# Patient Record
Sex: FEMALE | Race: BLACK OR AFRICAN AMERICAN | Employment: UNEMPLOYED | URBAN - METROPOLITAN AREA
[De-identification: names, ages, dates, MRNs, and addresses within clinical notes are randomized per-mention and may not be internally consistent; named-entity substitution may affect disease eponyms.]

---

## 2017-04-21 ENCOUNTER — HOSPITAL ENCOUNTER (EMERGENCY)
Facility: CLINIC | Age: 52
Discharge: HOME | End: 2017-04-21
Attending: FAMILY MEDICINE | Admitting: FAMILY MEDICINE

## 2017-04-21 ENCOUNTER — APPOINTMENT (OUTPATIENT)
Dept: GENERAL RADIOLOGY | Facility: CLINIC | Age: 52
End: 2017-04-21

## 2017-04-21 VITALS
SYSTOLIC BLOOD PRESSURE: 110 MMHG | TEMPERATURE: 98.6 F | DIASTOLIC BLOOD PRESSURE: 89 MMHG | WEIGHT: 153.2 LBS | HEART RATE: 75 BPM | RESPIRATION RATE: 20 BRPM | BODY MASS INDEX: 27.14 KG/M2 | OXYGEN SATURATION: 100 %
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
## 2017-04-21 DIAGNOSIS — S89.91XA INJURY OF RIGHT KNEE: ICD-10-CM

## 2017-04-21 DIAGNOSIS — S89.91XS RIGHT KNEE INJURY, SEQUELA: ICD-10-CM

## 2017-04-21 DIAGNOSIS — T73.3XXA OVEREXERTION FROM PROLONGED STATIC POSITION: ICD-10-CM

## 2017-04-21 PROCEDURE — 73562 X-RAY EXAM OF KNEE 3: CPT | Mod: RT

## 2017-04-21 PROCEDURE — 99283 EMERGENCY DEPT VISIT LOW MDM: CPT

## 2017-04-21 PROCEDURE — 99283 EMERGENCY DEPT VISIT LOW MDM: CPT | Mod: GC | Performed by: FAMILY MEDICINE

## 2017-04-21 ASSESSMENT — ENCOUNTER SYMPTOMS
FEVER: 0
RHINORRHEA: 0
EYE REDNESS: 0
ABDOMINAL PAIN: 0
JOINT SWELLING: 1
COUGH: 0
VOMITING: 0
DYSURIA: 0
CONSTIPATION: 0
COLOR CHANGE: 0
ARTHRALGIAS: 0
DIFFICULTY URINATING: 0
DIARRHEA: 0
MYALGIAS: 0
BACK PAIN: 0
WHEEZING: 0
CONFUSION: 0
WEAKNESS: 0
SHORTNESS OF BREATH: 0
CHILLS: 0
HEADACHES: 0
LIGHT-HEADEDNESS: 0
NAUSEA: 0
DIAPHORESIS: 0
WOUND: 0
SORE THROAT: 0
SINUS PRESSURE: 0
NUMBNESS: 0
DIZZINESS: 0
EYE PAIN: 0
NECK STIFFNESS: 0
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
## 2017-04-21 NOTE — LETTER
[facility], [place], EMERGENCY DEPARTMENT  [address]  [place] [address]  [phone]    2017    Juan F Bahena  [address] [place] [address]  [place] [address]  [phone] (home)     : 1965      To Whom it may concern:    Juan F Bahena was seen in our Emergency Department today, 2017.  I expect her condition to improve over the next 3 days.  She may return to work in the next few days when improved.    Sincerely,        Eb Casey MD

## 2017-04-21 NOTE — DISCHARGE INSTRUCTIONS
You came to the Emergency Department with right knee pain and suspicion of exacerbation of previous knee injury.   Knee XR did not show injury to bone, cartilage or tendons of knee.  It is believed that you have re-exacerbated your prior tibial plateau fracture.  We recommend you pursue RICE:    R: Rest (minimize weight-bearing.)    I: Ice (10 minutes per hour)    C: Compression (ACE bandage)    E: Elevation (when possible)  We recommend you follow up with your primary care provider, or with an outpatient sports medicine clinic, in the next 1-2 weeks.  Please return to clinic or ED if you experience significant worsening of your symptoms.    A work excuse note has been issued for you.    Please make an appointment to follow up with one of the following:    Your Primary Care Provider,     Primary Care Center (phone: [phone],     [facility] (phone: [phone]),     [facility] (phone: [phone]),     [facility] (phone: [phone]),     Sports Medicine (phone: [phone]) and     [facility] [phone]   in the next 7-14 days.      Common Kneecap (Patella) Problems  If the kneecap is  off track  even slightly (a tracking problem), it can cause uneven pressure on the back of the kneecap. This can cause pain and difficulty with movements, such as walking and going down stairs. Below are some common causes of kneecap pain.    Cartilage damage  Sometimes the cartilage on the back of the kneecap or in the groove of the thighbone is damaged. Damaged cartilage can t spread pressure evenly. Uneven pressure wears down the cartilage even further.   Dislocation  Sometimes a muscle or ligament in the knee is pulled the wrong way. Or the kneecap may be pushed too hard. Then the kneecap may move partly out of the groove (subluxation). It may even move completely out (dislocation).     Patellar tendinitis  Patellar tendinitis ( jumper s knee ) happens when  the quadriceps muscles are overused or tight. During movement, the patellar tendon absorbs more shock than usual. The tendon becomes irritated or damaged.   Plica syndrome  Plica bands are tissue fibers that some people have near the kneecap. They usually cause no problems. But sometimes they can become irritated or inflamed.     5653-8222 [employer]. [address] [place] [address], [place] [address]. All rights reserved. This information is not intended as a substitute for professional medical care. Always follow your healthcare professional's instructions.

## 2017-04-21 NOTE — ED AVS SNAPSHOT
[facility], Emergency Department    [address]    [place] [address]    Phone:  [phone]    Fax:  [phone]                                       Juan F Bahena   MRN: [id number]    Department:  [facility], Emergency Department   Date of Visit:  4/21/2017           Patient Information     Date Of Birth          1965        Your diagnoses for this visit were:     Right knee injury, sequela        You were seen by Gary Hill MD.      Follow-up Information     Follow up with Kinsey Jean Baptiste MD In 1 week.    Specialty:  OB/Gyn    Contact information:    [facility]  [address]  [facility] [address] [phone]          Discharge Instructions       You came to the Emergency Department with right knee pain and suspicion of exacerbation of previous knee injury.   Knee XR did not show injury to bone, cartilage or tendons of knee.  It is believed that you have re-exacerbated your prior tibial plateau fracture.  We recommend you pursue RICE:    R: Rest (minimize weight-bearing.)    I: Ice (10 minutes per hour)    C: Compression (ACE bandage)    E: Elevation (when possible)  We recommend you follow up with your primary care provider, or with an outpatient sports medicine clinic, in the next 1-2 weeks.  Please return to clinic or ED if you experience significant worsening of your symptoms.    A work excuse note has been issued for you.    Please make an appointment to follow up with one of the following:    Your Primary Care Provider,     Primary Care Center (phone: [phone],     [facility] (phone: [phone]),     [facility] (phone: [phone]),     [facility] (phone: [phone]),     Sports Medicine (phone: [phone]) and     [facility] [phone]   in the next 7-14 days.      Common Kneecap (Patella) Problems  If the kneecap is  off track  even slightly (a tracking problem), it can  cause uneven pressure on the back of the kneecap. This can cause pain and difficulty with movements, such as walking and going down stairs. Below are some common causes of kneecap pain.    Cartilage damage  Sometimes the cartilage on the back of the kneecap or in the groove of the thighbone is damaged. Damaged cartilage can t spread pressure evenly. Uneven pressure wears down the cartilage even further.   Dislocation  Sometimes a muscle or ligament in the knee is pulled the wrong way. Or the kneecap may be pushed too hard. Then the kneecap may move partly out of the groove (subluxation). It may even move completely out (dislocation).     Patellar tendinitis  Patellar tendinitis ( jumper s knee ) happens when the quadriceps muscles are overused or tight. During movement, the patellar tendon absorbs more shock than usual. The tendon becomes irritated or damaged.   Plica syndrome  Plica bands are tissue fibers that some people have near the kneecap. They usually cause no problems. But sometimes they can become irritated or inflamed.     9665-3133 [employer]. [address] [place] [address]. All rights reserved. This information is not intended as a substitute for professional medical care. Always follow your healthcare professional's instructions.              24 Hour Appointment Hotline       To make an appointment at any [facility], call [phone] ([phone]). If you don't have a family doctor or clinic, we will help you find one. [facility] clinics are conveniently located to serve the needs of you and your family.             Review of your medicines      Our records show that you are taking the medicines listed below. If these are incorrect, please call your family doctor or clinic.        Dose / Directions Last dose taken    acetaminophen 325 MG tablet   Commonly known as:  TYLENOL   Dose:  650 mg   Quantity:  100 tablet        Take 2 tablets (650 mg) by mouth every 4 hours  "as needed for mild pain   Refills:  0        ibuprofen 800 MG tablet   Commonly known as:  ADVIL/MOTRIN   Dose:  800 mg   Quantity:  90 tablet        Take 1 tablet (800 mg) by mouth every 8 hours as needed for moderate pain   Refills:  1                Procedures and tests performed during your visit     Knee XR, 3 views, right      Orders Needing Specimen Collection     None      Pending Results     Date and Time Order Name Status Description    2017 1710 Knee XR, 3 views, right Preliminary             Pending Culture Results     No orders found from 2017 to 2017.            Thank you for choosing [facility]       Thank you for choosing [facility] for your care. Our goal is always to provide you with excellent care. Hearing back from our patients is one way we can continue to improve our services. Please take a few minutes to complete the written survey that you may receive in the mail after you visit with us. Thank you!        [employer] Information     [employer] lets you send messages to your doctor, view your test results, renew your prescriptions, schedule appointments and more. To sign up, go to [email or web address] . Click on \"Log in\" on the left side of the screen, which will take you to the Welcome page. Then click on \"Sign up Now\" on the right side of the page.     You will be asked to enter the access code listed below, as well as some personal information. Please follow the directions to create your username and password.     Your access code is: [email or web address]  Expires: 2017  6:37 PM     Your access code will  in 90 days. If you need help or a new code, please call your [facility] clinic or [phone].        Care EveryWhere ID     This is your Care EveryWhere ID. This could be used by other organizations to access your [facility] medical records  [id number]        After Visit Summary       This is your record. Keep this with you and show to your community pharmacist(s) and doctor(s) " at your next visit.

## 2017-04-21 NOTE — ED AVS SNAPSHOT
[facility], [place], Emergency Department    [address]    [facility] [address]    Phone:  [phone]    Fax:  [phone]                                       Juan F Bahena   MRN: [id number]    Department:  [facility], Emergency Department   Date of Visit:  4/21/2017           After Visit Summary Signature Page     I have received my discharge instructions, and my questions have been answered. I have discussed any challenges I see with this plan with the nurse or doctor.    ..........................................................................................................................................  Patient/Patient Representative Signature      ..........................................................................................................................................  Patient Representative Print Name and Relationship to Patient    ..................................................               ................................................  Date                                            Time    ..........................................................................................................................................  Reviewed by Signature/Title    ...................................................              ..............................................  Date                                                            Time

## 2017-04-22 ASSESSMENT — ENCOUNTER SYMPTOMS: ACTIVITY CHANGE: 1

## 2017-06-10 ENCOUNTER — HEALTH MAINTENANCE LETTER (OUTPATIENT)
Age: 52
End: 2017-06-10

## 2018-03-12 ENCOUNTER — TELEPHONE (OUTPATIENT)
Dept: FAMILY MEDICINE | Facility: CLINIC | Age: 53
End: 2018-03-12

## 2018-03-12 NOTE — TELEPHONE ENCOUNTER
"Pt is past due for f/u pap smear.  Reminder letter was sent 05/19/17.  Call to pt--woman answered the phone.   I asked if the pt was available and she asked who was calling, so I gave her my name & that I work with [employer].   She stated, \"you know, I think you guys keep calling the wrong number.\"   I asked if this was the incorrect number for pt, and the woman stated, \"this has got to be a joke,\" and hung up.  Additional reminder letter sent.  Thi Aragon,    Pap Tracking      "

## 2018-06-16 ENCOUNTER — HEALTH MAINTENANCE LETTER (OUTPATIENT)
Age: 53
End: 2018-06-16

## 2018-07-20 ENCOUNTER — HOSPITAL ENCOUNTER (EMERGENCY)
Facility: CLINIC | Age: 53
Discharge: HOME | End: 2018-07-20

## 2021-07-17 ENCOUNTER — HOSPITAL ENCOUNTER (EMERGENCY)
Facility: CLINIC | Age: 56
Discharge: LEFT BEFORE DISCHARGE | End: 2021-07-17
Payer: COMMERCIAL